# Patient Record
Sex: FEMALE
[De-identification: names, ages, dates, MRNs, and addresses within clinical notes are randomized per-mention and may not be internally consistent; named-entity substitution may affect disease eponyms.]

---
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
## 2024-11-18 ENCOUNTER — APPOINTMENT (OUTPATIENT)
Dept: GENERAL RADIOLOGY | Age: 19
End: 2024-11-18
Attending: PHYSICIAN ASSISTANT

## 2024-11-18 ENCOUNTER — HOSPITAL ENCOUNTER (EMERGENCY)
Age: 19
Discharge: HOME | End: 2024-11-18
Attending: EMERGENCY MEDICINE

## 2024-11-18 VITALS
HEIGHT: 70 IN | BODY MASS INDEX: 22.9 KG/M2 | WEIGHT: 160 LBS | HEART RATE: 92 BPM | TEMPERATURE: 98 F | OXYGEN SATURATION: 97 % | RESPIRATION RATE: 18 BRPM | DIASTOLIC BLOOD PRESSURE: 58 MMHG | SYSTOLIC BLOOD PRESSURE: 116 MMHG

## 2024-11-18 DIAGNOSIS — B34.9 VIRAL SYNDROME: Primary | ICD-10-CM

## 2024-11-18 LAB
POCT INFLUENZA A: NEGATIVE
POCT INFLUENZA B: NEGATIVE
SARS-COV-2 RNA RESP QL NAA+PROBE: NOT DETECTED

## 2024-11-18 PROCEDURE — 87502 INFLUENZA DNA AMP PROBE: CPT

## 2024-11-18 PROCEDURE — 87430 STREP A AG IA: CPT

## 2024-11-18 PROCEDURE — 99283 EMERGENCY DEPT VISIT LOW MDM: CPT

## 2024-11-18 PROCEDURE — 87430 STREP A AG IA: CPT | Performed by: EMERGENCY MEDICINE

## 2024-11-18 PROCEDURE — 99284 EMERGENCY DEPT VISIT MOD MDM: CPT

## 2024-11-18 PROCEDURE — 71046 X-RAY EXAM CHEST 2 VIEWS: CPT | Performed by: PHYSICIAN ASSISTANT

## 2024-11-18 PROCEDURE — 87502 INFLUENZA DNA AMP PROBE: CPT | Performed by: EMERGENCY MEDICINE

## 2024-11-18 RX ORDER — BENZONATATE 100 MG/1
100 CAPSULE ORAL 3 TIMES DAILY PRN
Qty: 30 CAPSULE | Refills: 0 | Status: SHIPPED | OUTPATIENT
Start: 2024-11-18 | End: 2024-12-18

## 2024-11-18 RX ORDER — FLUTICASONE PROPIONATE 50 MCG
2 SPRAY, SUSPENSION (ML) NASAL DAILY
Qty: 16 G | Refills: 0 | Status: SHIPPED | OUTPATIENT
Start: 2024-11-18 | End: 2024-12-18

## 2024-11-18 RX ORDER — IBUPROFEN 600 MG/1
600 TABLET, FILM COATED ORAL EVERY 8 HOURS PRN
Qty: 30 TABLET | Refills: 0 | Status: SHIPPED | OUTPATIENT
Start: 2024-11-18 | End: 2024-11-25

## 2024-11-18 NOTE — DISCHARGE INSTRUCTIONS
Please follow up with your PCP if no improvement within 5-7 days. Go directly to the ER for any acute worsening of symptoms.    If you smoke, stop smoking.  Push oral fluids to help loosen up chest mucous and move it out of your body.  Use a cold mist humidifier.  Get enough rest and sleep.  Take Guaifenesin (Robitussin), an expectorant to loosen mucous.  If antibiotics is prescribed, be sure to take all of the the medication even if you improve before finishing the medication  Seek immediate medical attention if symptoms worsen, if fever higher than 102, if no improvement in a few days.  Symptoms usually last 10 days.    Ibuprofen 400mg 3 times a day with food for 3 to 5 days, may alternate with Tylenol 500mg  Light activity, warm cool compresses topically for discomfort  Return if worse

## 2024-11-18 NOTE — ED PROVIDER NOTES
Patient Seen in: [facility] In [place]      History     Chief Complaint   Patient presents with    Fever    Body ache and/or chills     Stated Complaint: Body aches,chills,fevers,sore throat,night sweats and congestion    Subjective:   HPI    19-year-old female who comes in today complaining of bodyaches chills, subjective fever sore throat cough and congestion for the past 2 weeks.  Patient ports generalized bodyaches.  Patient reports multiple people at work sick with similar symptoms.       Objective:     History reviewed. No pertinent past medical history.           Past Surgical History:   Procedure Laterality Date    Tonsillectomy                  Social History     Socioeconomic History    Marital status: Single   Tobacco Use    Smoking status: Never    Smokeless tobacco: Never   Vaping Use    Vaping status: Never Used   Substance and Sexual Activity    Alcohol use: Yes     Comment: social    Drug use: Never     Social Drivers of Health      Received from [place]    [facility]                  Physical Exam     ED Triage Vitals [11/18/24 1531]   /79   Pulse 84   Resp 18   Temp 98.4 °F (36.9 °C)   Temp src    SpO2 97 %   O2 Device None (Room air)       Current Vitals:   Vital Signs  BP: 116/58  Pulse: 92  Resp: 18  Temp: 98.4 °F (36.9 °C)    Oxygen Therapy  SpO2: 97 %  O2 Device: None (Room air)        Physical Exam  General Appearance: Alert, cooperative, no distress, appropriate for age   Head: Normocephalic, without obvious abnormality   Eyes: PERRL,  conjunctiva and cornea clear, both eyes   Ears: TM pearly gray color and semitransparent, external ear canals normal, both ears   Nose: Nares symmetrical, septum midline, mucosa normal, clear watery discharge; no sinus tenderness   Throat: Lips, tongue, and mucosa are moist, pink, and intact; teeth intact. No erythema, no exudates or tonsillar hypertrophy, uvula midline, no trismus or drooling no phonation changes, patient  handling secretions well   Neck: Supple; no anterior or posterior cervical adenopathy, no neck rigidity or meningeal signs  Lungs: Clear to auscultation bilaterally, respirations unlabored. No wheezing, rales or rhonchi.   Heart: NSR, S1, S2 present. No murmurs, rubs or gallops.  Skin: no rash       ED Course     Labs Reviewed   RAPID STREP A SCREEN (LC) - Normal    Narrative:     A confirmatory culture is recommended if clinically indicated.   RAPID SARS-COV-2 BY PCR - Normal   POCT FLU TEST - Normal    Narrative:     This assay is a rapid molecular in vitro test utilizing nucleic acid amplification of influenza A and B viral RNA.        XR CHEST PA + LAT CHEST (CPT=71046)    Result Date: 11/18/2024  PROCEDURE:  XR CHEST PA + LAT CHEST (CPT=71046)  INDICATIONS:  Body aches,chills,fevers,sore throat,night sweats and congestion  COMPARISON:  None.  TECHNIQUE:  PA and lateral chest radiographs were obtained.  PATIENT STATED HISTORY: (As transcribed by Technologist)  Day 4 productive cough, fever, sore throat, bodyache and chills.    FINDINGS:  Cardiac size and pulmonary vasculature are within normal limits. No pleural effusions. No pneumothorax.            CONCLUSION:  No acute pulmonary findings.   LOCATION:  [facility]   Dictated by (CST): Curt Saleem MD on 11/18/2024 at 5:08 PM     Finalized by (CST): Curt Saleem MD on 11/18/2024 at 5:08 PM             [facility]          Medical Decision Making  19-year-old female who comes in today complaining of subjective fevers, chills body aches cough congestion that started approximately 2 weeks ago and has been on and off since.  Multiple people at work are sick with similar symptoms.    Problems Addressed:  Viral syndrome: acute illness or injury    Amount and/or Complexity of Data Reviewed  Labs: ordered. Decision-making details documented in ED Course.     Details: Covid, flu, strep all negative   Radiology: ordered and independent interpretation performed.  Decision-making details documented in ED Course.     Details: I personally reviewed the patients CXR no evidence of acute consolidation or pleural effusion  Discussion of management or test interpretation with external provider(s): Discussed with and evaluated the patient with Dr. Dasilva who agrees with assessment and plan.    Risk  OTC drugs.  Prescription drug management.  Risk Details: Clinical Impression: Viral upper respiratory infection      The differential diagnosis before testing included viral syndrome, Acute Sinusitis, pneumonia, which is a medical condition that poses a threat to life/function.     Discussed with the patient viral instructions, Tylenol ibuprofen cough suppressant and Flonase were prescribed, if persistent or worsening symptoms be reevaluated by PCP.            Disposition and Plan     Clinical Impression:  1. Viral syndrome         Disposition:  Discharge  11/18/2024  5:26 pm    Follow-up:  Carson Gordon MD  [address] [place] [id number]  [phone]    Schedule an appointment as soon as possible for a visit  If symptoms worsen          Medications Prescribed:  Discharge Medication List as of 11/18/2024  5:26 PM        START taking these medications    Details   benzonatate 100 MG Oral Cap Take 1 capsule (100 mg total) by mouth 3 (three) times daily as needed for cough., Normal, Disp-30 capsule, R-0      fluticasone propionate 50 MCG/ACT Nasal Suspension 2 sprays by Nasal route daily., Normal, Disp-16 g, R-0      ibuprofen 600 MG Oral Tab Take 1 tablet (600 mg total) by mouth every 8 (eight) hours as needed for Pain or Fever., Normal, Disp-30 tablet, R-0                 Supplementary Documentation:

## (undated) NOTE — LETTER
Date & Time: 11/19/2024, 6:45 PM  Patient: Queen Ayden  Encounter Provider(s):    Jim Dasilva DO Hoge, Kristin P, PA-C       To Whom It May Concern:    Queen Ayden was seen and treated in our department on 11/18/2024. She should not return to work until 11/21/2024 or until she is fever free without the use of antipyretics for 24 hours.  .    If you have any questions or concerns, please do not hesitate to call.        _____________________________  Physician/APC Signature